# Patient Record
Sex: MALE | Race: WHITE | NOT HISPANIC OR LATINO | URBAN - METROPOLITAN AREA
[De-identification: names, ages, dates, MRNs, and addresses within clinical notes are randomized per-mention and may not be internally consistent; named-entity substitution may affect disease eponyms.]

---

## 2018-01-06 ENCOUNTER — EMERGENCY (EMERGENCY)
Facility: HOSPITAL | Age: 73
LOS: 1 days | Discharge: HOME | End: 2018-01-06
Attending: EMERGENCY MEDICINE | Admitting: EMERGENCY MEDICINE
Payer: MEDICARE

## 2018-01-06 VITALS
HEART RATE: 67 BPM | TEMPERATURE: 98 F | RESPIRATION RATE: 16 BRPM | SYSTOLIC BLOOD PRESSURE: 109 MMHG | DIASTOLIC BLOOD PRESSURE: 61 MMHG | OXYGEN SATURATION: 98 %

## 2018-01-06 VITALS
RESPIRATION RATE: 16 BRPM | SYSTOLIC BLOOD PRESSURE: 150 MMHG | WEIGHT: 199.96 LBS | HEART RATE: 84 BPM | TEMPERATURE: 98 F | OXYGEN SATURATION: 95 % | DIASTOLIC BLOOD PRESSURE: 79 MMHG

## 2018-01-06 DIAGNOSIS — I10 ESSENTIAL (PRIMARY) HYPERTENSION: ICD-10-CM

## 2018-01-06 DIAGNOSIS — Z41.9 ENCOUNTER FOR PROCEDURE FOR PURPOSES OTHER THAN REMEDYING HEALTH STATE, UNSPECIFIED: Chronic | ICD-10-CM

## 2018-01-06 DIAGNOSIS — Z79.2 LONG TERM (CURRENT) USE OF ANTIBIOTICS: ICD-10-CM

## 2018-01-06 DIAGNOSIS — R39.15 URGENCY OF URINATION: ICD-10-CM

## 2018-01-06 DIAGNOSIS — N39.0 URINARY TRACT INFECTION, SITE NOT SPECIFIED: ICD-10-CM

## 2018-01-06 DIAGNOSIS — Z79.899 OTHER LONG TERM (CURRENT) DRUG THERAPY: ICD-10-CM

## 2018-01-06 DIAGNOSIS — E78.5 HYPERLIPIDEMIA, UNSPECIFIED: ICD-10-CM

## 2018-01-06 LAB
APPEARANCE UR: (no result)
BILIRUB UR-MCNC: NEGATIVE — SIGNIFICANT CHANGE UP
COLOR SPEC: YELLOW — SIGNIFICANT CHANGE UP
DIFF PNL FLD: (no result)
GLUCOSE UR QL: NEGATIVE — SIGNIFICANT CHANGE UP
KETONES UR-MCNC: (no result) MG/DL
LEUKOCYTE ESTERASE UR-ACNC: (no result)
NITRITE UR-MCNC: POSITIVE
PH UR: 6 — SIGNIFICANT CHANGE UP (ref 5–8)
PROT UR-MCNC: 100 MG/DL
SP GR SPEC: 1.02 — SIGNIFICANT CHANGE UP (ref 1–1.03)
UROBILINOGEN FLD QL: 0.2 E.U./DL — SIGNIFICANT CHANGE UP

## 2018-01-06 PROCEDURE — 81001 URINALYSIS AUTO W/SCOPE: CPT

## 2018-01-06 PROCEDURE — 99284 EMERGENCY DEPT VISIT MOD MDM: CPT | Mod: 25

## 2018-01-06 PROCEDURE — 51702 INSERT TEMP BLADDER CATH: CPT

## 2018-01-06 PROCEDURE — 96374 THER/PROPH/DIAG INJ IV PUSH: CPT | Mod: XU

## 2018-01-06 PROCEDURE — 99284 EMERGENCY DEPT VISIT MOD MDM: CPT

## 2018-01-06 PROCEDURE — 87186 SC STD MICRODIL/AGAR DIL: CPT

## 2018-01-06 PROCEDURE — 87086 URINE CULTURE/COLONY COUNT: CPT

## 2018-01-06 RX ORDER — CEFTRIAXONE 500 MG/1
1 INJECTION, POWDER, FOR SOLUTION INTRAMUSCULAR; INTRAVENOUS ONCE
Qty: 0 | Refills: 0 | Status: COMPLETED | OUTPATIENT
Start: 2018-01-06 | End: 2018-01-06

## 2018-01-06 RX ADMIN — CEFTRIAXONE 100 GRAM(S): 500 INJECTION, POWDER, FOR SOLUTION INTRAMUSCULAR; INTRAVENOUS at 15:30

## 2018-01-06 NOTE — ED ADULT TRIAGE NOTE - CHIEF COMPLAINT QUOTE
Patient c/o urinary urgency , dribbling when urinate and pressure on lower abdomen since 1:30 am . History of Prostate cancer .

## 2018-01-06 NOTE — ED PROVIDER NOTE - MEDICAL DECISION MAKING DETAILS
pt in urinary retention, has been on bactrim followed by keflex for uti, has outpatient gu  and will f/u in 1 d, no signs of pyelo, pratt placed and drained 1100cc urine, + for inf, culture sent although will unlikely be helpful given abx use, f/u w/gu, understands to return immediately for any fevers/pain / worsening symptoms

## 2018-01-06 NOTE — ED ADULT NURSE NOTE - OBJECTIVE STATEMENT
pt presents to ED A&Ox3 c/o lower abdominal pain/pressure starting around 130 am. pt also reports some burning urination, urgency and dribbling. denies fever/chill, hematuria, n/v/d.

## 2018-01-06 NOTE — ED PROVIDER NOTE - OBJECTIVE STATEMENT
The pt is a 73 y/o M, who presents to ED c/o urgency and hesitancy and small urine output today. Pt has been tx for UTI by his gu as outpatient - 2 wk course of bactrim - no resolution of symptoms, now started on keflex a few d ago and today in retention. Denies flank pain, fevers, chills, n/v, cp, sob, cough

## 2018-01-06 NOTE — ED ADULT NURSE NOTE - CHPI ED SYMPTOMS NEG
no hematuria/no abdominal distension/no vomiting/no fever/no nausea/no chills/no blood in stool/no diarrhea

## 2018-01-06 NOTE — ED PROVIDER NOTE - ATTENDING CONTRIBUTION TO CARE
71 yo male co pain with urination and difficulty urinating today.  has been on keflex for past 2 days by his urologist.  pratt placed here, 1100ml clear urine returned, pt feels much better.  given 1 dose of ceftriazoxe, ua looks infected, urine culture sent, will continue abx and fu with his urologist

## 2018-01-06 NOTE — ED ADULT NURSE REASSESSMENT NOTE - NS ED NURSE REASSESS COMMENT FT1
14 fr indwelling pratt inserted per orders. pt tolerated well. 350 cc clear, yellow urine out upon insertion.

## 2018-01-06 NOTE — ED PROVIDER NOTE - PROGRESS NOTE DETAILS
discussed w/gu - recommending dose of iv ceftriaxone, to con't keflex and f/u w/his gu in 1 d -- pt happy w/plan

## 2018-01-06 NOTE — ED ADULT NURSE NOTE - PMH
HLD (hyperlipidemia)    HTN (hypertension)    Prostate calculus    Prostate cancer    Sleep apnea    Thyroid disease

## 2018-01-09 LAB
-  AMPICILLIN/SULBACTAM: SIGNIFICANT CHANGE UP
-  AMPICILLIN: SIGNIFICANT CHANGE UP
-  CEFAZOLIN: SIGNIFICANT CHANGE UP
-  CEFTRIAXONE: SIGNIFICANT CHANGE UP
-  CIPROFLOXACIN: SIGNIFICANT CHANGE UP
-  GENTAMICIN: SIGNIFICANT CHANGE UP
-  NITROFURANTOIN: SIGNIFICANT CHANGE UP
-  PIPERACILLIN/TAZOBACTAM: SIGNIFICANT CHANGE UP
-  TOBRAMYCIN: SIGNIFICANT CHANGE UP
-  TRIMETHOPRIM/SULFAMETHOXAZOLE: SIGNIFICANT CHANGE UP
CULTURE RESULTS: SIGNIFICANT CHANGE UP
METHOD TYPE: SIGNIFICANT CHANGE UP
ORGANISM # SPEC MICROSCOPIC CNT: SIGNIFICANT CHANGE UP
ORGANISM # SPEC MICROSCOPIC CNT: SIGNIFICANT CHANGE UP
SPECIMEN SOURCE: SIGNIFICANT CHANGE UP

## 2018-02-13 VITALS
HEART RATE: 75 BPM | RESPIRATION RATE: 17 BRPM | OXYGEN SATURATION: 94 % | HEIGHT: 72 IN | DIASTOLIC BLOOD PRESSURE: 67 MMHG | SYSTOLIC BLOOD PRESSURE: 138 MMHG | WEIGHT: 199.96 LBS | TEMPERATURE: 98 F

## 2018-02-13 NOTE — ASU PATIENT PROFILE, ADULT - PSH
Surgery, elective  tumor removal from neck  Surgery, elective  prostate cancer surgery  Surgery, elective  carotid artery surgery

## 2018-02-14 ENCOUNTER — INPATIENT (INPATIENT)
Facility: HOSPITAL | Age: 73
LOS: 0 days | Discharge: HOME | DRG: 713 | End: 2018-02-15
Attending: UROLOGY | Admitting: UROLOGY
Payer: COMMERCIAL

## 2018-02-14 DIAGNOSIS — Z41.9 ENCOUNTER FOR PROCEDURE FOR PURPOSES OTHER THAN REMEDYING HEALTH STATE, UNSPECIFIED: Chronic | ICD-10-CM

## 2018-02-14 RX ORDER — LIDOCAINE 4 G/100G
1 CREAM TOPICAL ONCE
Qty: 0 | Refills: 0 | Status: DISCONTINUED | OUTPATIENT
Start: 2018-02-14 | End: 2018-02-15

## 2018-02-14 RX ORDER — CIPROFLOXACIN LACTATE 400MG/40ML
250 VIAL (ML) INTRAVENOUS EVERY 12 HOURS
Qty: 0 | Refills: 0 | Status: DISCONTINUED | OUTPATIENT
Start: 2018-02-14 | End: 2018-02-15

## 2018-02-14 RX ORDER — MORPHINE SULFATE 50 MG/1
2 CAPSULE, EXTENDED RELEASE ORAL ONCE
Qty: 0 | Refills: 0 | Status: DISCONTINUED | OUTPATIENT
Start: 2018-02-14 | End: 2018-02-15

## 2018-02-14 RX ORDER — SODIUM CHLORIDE 9 MG/ML
1000 INJECTION, SOLUTION INTRAVENOUS
Qty: 0 | Refills: 0 | Status: DISCONTINUED | OUTPATIENT
Start: 2018-02-14 | End: 2018-02-15

## 2018-02-14 RX ORDER — SENNA PLUS 8.6 MG/1
1 TABLET ORAL AT BEDTIME
Qty: 0 | Refills: 0 | Status: DISCONTINUED | OUTPATIENT
Start: 2018-02-14 | End: 2018-02-15

## 2018-02-14 RX ORDER — ONDANSETRON 8 MG/1
4 TABLET, FILM COATED ORAL EVERY 6 HOURS
Qty: 0 | Refills: 0 | Status: DISCONTINUED | OUTPATIENT
Start: 2018-02-14 | End: 2018-02-15

## 2018-02-14 RX ORDER — ATENOLOL 25 MG/1
50 TABLET ORAL DAILY
Qty: 0 | Refills: 0 | Status: DISCONTINUED | OUTPATIENT
Start: 2018-02-14 | End: 2018-02-15

## 2018-02-14 RX ORDER — ACETAMINOPHEN 500 MG
650 TABLET ORAL EVERY 6 HOURS
Qty: 0 | Refills: 0 | Status: DISCONTINUED | OUTPATIENT
Start: 2018-02-14 | End: 2018-02-15

## 2018-02-14 RX ORDER — DOCUSATE SODIUM 100 MG
100 CAPSULE ORAL THREE TIMES A DAY
Qty: 0 | Refills: 0 | Status: DISCONTINUED | OUTPATIENT
Start: 2018-02-14 | End: 2018-02-15

## 2018-02-14 RX ORDER — SODIUM CHLORIDE 9 MG/ML
500 INJECTION INTRAMUSCULAR; INTRAVENOUS; SUBCUTANEOUS ONCE
Qty: 0 | Refills: 0 | Status: COMPLETED | OUTPATIENT
Start: 2018-02-14 | End: 2018-02-14

## 2018-02-14 RX ORDER — TAMSULOSIN HYDROCHLORIDE 0.4 MG/1
0.4 CAPSULE ORAL AT BEDTIME
Qty: 0 | Refills: 0 | Status: DISCONTINUED | OUTPATIENT
Start: 2018-02-14 | End: 2018-02-15

## 2018-02-14 RX ORDER — ATORVASTATIN CALCIUM 80 MG/1
80 TABLET, FILM COATED ORAL AT BEDTIME
Qty: 0 | Refills: 0 | Status: DISCONTINUED | OUTPATIENT
Start: 2018-02-14 | End: 2018-02-15

## 2018-02-14 RX ORDER — FINASTERIDE 5 MG/1
5 TABLET, FILM COATED ORAL DAILY
Qty: 0 | Refills: 0 | Status: DISCONTINUED | OUTPATIENT
Start: 2018-02-14 | End: 2018-02-15

## 2018-02-14 RX ADMIN — Medication 100 MILLIGRAM(S): at 22:24

## 2018-02-14 RX ADMIN — SODIUM CHLORIDE 75 MILLILITER(S): 9 INJECTION, SOLUTION INTRAVENOUS at 16:29

## 2018-02-14 RX ADMIN — ATORVASTATIN CALCIUM 80 MILLIGRAM(S): 80 TABLET, FILM COATED ORAL at 22:25

## 2018-02-14 RX ADMIN — SENNA PLUS 1 TABLET(S): 8.6 TABLET ORAL at 22:24

## 2018-02-14 RX ADMIN — SODIUM CHLORIDE 500 MILLILITER(S): 9 INJECTION INTRAMUSCULAR; INTRAVENOUS; SUBCUTANEOUS at 15:00

## 2018-02-14 RX ADMIN — Medication 250 MILLIGRAM(S): at 17:55

## 2018-02-14 RX ADMIN — TAMSULOSIN HYDROCHLORIDE 0.4 MILLIGRAM(S): 0.4 CAPSULE ORAL at 22:24

## 2018-02-14 NOTE — PROGRESS NOTE ADULT - SUBJECTIVE AND OBJECTIVE BOX
Postop:  Pt denies chest pain, SOB, NV or severe abdominal pain             Vital Signs Last 24 Hrs  T(C): 35.8 (14 Feb 2018 10:20), Max: 35.8 (14 Feb 2018 10:20)  T(F): 96.4 (14 Feb 2018 10:20), Max: 96.4 (14 Feb 2018 10:20)  HR: 76 (14 Feb 2018 12:30) (68 - 90)  BP: 138/62 (14 Feb 2018 12:30) (117/63 - 138/62)  BP(mean): 86 (14 Feb 2018 10:35) (86 - 96)  RR: 16 (14 Feb 2018 12:30) (14 - 16)  SpO2: 97% (14 Feb 2018 12:30) (91% - 97%)    I&O's Summary    14 Feb 2018 07:01  -  14 Feb 2018 13:04  --------------------------------------------------------  IN: 1225 mL / OUT: 105 mL / NET: 1120 mL        Gen: nad    Abd: ntnd    : pratt draining clear             cultures    A/P:72M hx necrotic prostate s/p cysto, TURP 2/14  1- Pratt - monitor UOP  2- Regular diet  3- ABX- Cipro  4- Pain meds PRN  5- OOB amb, IS

## 2018-02-14 NOTE — BRIEF OPERATIVE NOTE - PROCEDURE
<<-----Click on this checkbox to enter Procedure TURP (transurethral resection of prostate)  02/14/2018    Active  SMENDONCA  Cystoscopic laser lithotripsy of bladder calculus  02/14/2018  from necrotic prostate  Active  SMENDONCA

## 2018-02-15 ENCOUNTER — TRANSCRIPTION ENCOUNTER (OUTPATIENT)
Age: 73
End: 2018-02-15

## 2018-02-15 VITALS — OXYGEN SATURATION: 97 % | HEART RATE: 85 BPM

## 2018-02-15 DIAGNOSIS — N40.0 BENIGN PROSTATIC HYPERPLASIA WITHOUT LOWER URINARY TRACT SYMPTOMS: ICD-10-CM

## 2018-02-15 LAB
ANION GAP SERPL CALC-SCNC: 12 MMOL/L — SIGNIFICANT CHANGE UP (ref 5–17)
BASOPHILS NFR BLD AUTO: 0.1 % — SIGNIFICANT CHANGE UP (ref 0–2)
BUN SERPL-MCNC: 30 MG/DL — HIGH (ref 7–23)
CALCIUM SERPL-MCNC: 9.5 MG/DL — SIGNIFICANT CHANGE UP (ref 8.4–10.5)
CHLORIDE SERPL-SCNC: 98 MMOL/L — SIGNIFICANT CHANGE UP (ref 96–108)
CO2 SERPL-SCNC: 25 MMOL/L — SIGNIFICANT CHANGE UP (ref 22–31)
CREAT SERPL-MCNC: 1.19 MG/DL — SIGNIFICANT CHANGE UP (ref 0.5–1.3)
EOSINOPHIL NFR BLD AUTO: 0 % — SIGNIFICANT CHANGE UP (ref 0–6)
GLUCOSE SERPL-MCNC: 146 MG/DL — HIGH (ref 70–99)
HCT VFR BLD CALC: 37.3 % — LOW (ref 39–50)
HGB BLD-MCNC: 12.5 G/DL — LOW (ref 13–17)
LYMPHOCYTES # BLD AUTO: 8.4 % — LOW (ref 13–44)
MAGNESIUM SERPL-MCNC: 1.5 MG/DL — LOW (ref 1.6–2.6)
MCHC RBC-ENTMCNC: 32.9 PG — SIGNIFICANT CHANGE UP (ref 27–34)
MCHC RBC-ENTMCNC: 33.5 G/DL — SIGNIFICANT CHANGE UP (ref 32–36)
MCV RBC AUTO: 98.2 FL — SIGNIFICANT CHANGE UP (ref 80–100)
MONOCYTES NFR BLD AUTO: 7.4 % — SIGNIFICANT CHANGE UP (ref 2–14)
NEUTROPHILS NFR BLD AUTO: 84.1 % — HIGH (ref 43–77)
PHOSPHATE SERPL-MCNC: 3.7 MG/DL — SIGNIFICANT CHANGE UP (ref 2.5–4.5)
PLATELET # BLD AUTO: 256 K/UL — SIGNIFICANT CHANGE UP (ref 150–400)
POTASSIUM SERPL-MCNC: 4.2 MMOL/L — SIGNIFICANT CHANGE UP (ref 3.5–5.3)
POTASSIUM SERPL-SCNC: 4.2 MMOL/L — SIGNIFICANT CHANGE UP (ref 3.5–5.3)
RBC # BLD: 3.8 M/UL — LOW (ref 4.2–5.8)
RBC # FLD: 13 % — SIGNIFICANT CHANGE UP (ref 10.3–16.9)
SODIUM SERPL-SCNC: 135 MMOL/L — SIGNIFICANT CHANGE UP (ref 135–145)
WBC # BLD: 12.3 K/UL — HIGH (ref 3.8–10.5)
WBC # FLD AUTO: 12.3 K/UL — HIGH (ref 3.8–10.5)

## 2018-02-15 PROCEDURE — C1889: CPT

## 2018-02-15 PROCEDURE — 85025 COMPLETE CBC W/AUTO DIFF WBC: CPT

## 2018-02-15 PROCEDURE — 82365 CALCULUS SPECTROSCOPY: CPT

## 2018-02-15 PROCEDURE — 52601 PROSTATECTOMY (TURP): CPT

## 2018-02-15 PROCEDURE — 84100 ASSAY OF PHOSPHORUS: CPT

## 2018-02-15 PROCEDURE — 86901 BLOOD TYPING SEROLOGIC RH(D): CPT

## 2018-02-15 PROCEDURE — 87086 URINE CULTURE/COLONY COUNT: CPT

## 2018-02-15 PROCEDURE — 86850 RBC ANTIBODY SCREEN: CPT

## 2018-02-15 PROCEDURE — 94660 CPAP INITIATION&MGMT: CPT

## 2018-02-15 PROCEDURE — 86900 BLOOD TYPING SEROLOGIC ABO: CPT

## 2018-02-15 PROCEDURE — 36415 COLL VENOUS BLD VENIPUNCTURE: CPT

## 2018-02-15 PROCEDURE — 52325 CYSTOSCOPY STONE REMOVAL: CPT

## 2018-02-15 PROCEDURE — 87106 FUNGI IDENTIFICATION YEAST: CPT

## 2018-02-15 PROCEDURE — 83735 ASSAY OF MAGNESIUM: CPT

## 2018-02-15 PROCEDURE — 80048 BASIC METABOLIC PNL TOTAL CA: CPT

## 2018-02-15 RX ORDER — FINASTERIDE 5 MG/1
1 TABLET, FILM COATED ORAL
Qty: 0 | Refills: 0 | COMMUNITY
Start: 2018-02-15

## 2018-02-15 RX ORDER — CIPROFLOXACIN LACTATE 400MG/40ML
1 VIAL (ML) INTRAVENOUS
Qty: 6 | Refills: 0 | OUTPATIENT
Start: 2018-02-15 | End: 2018-02-17

## 2018-02-15 RX ORDER — MAGNESIUM SULFATE 500 MG/ML
1 VIAL (ML) INJECTION ONCE
Qty: 0 | Refills: 0 | Status: COMPLETED | OUTPATIENT
Start: 2018-02-15 | End: 2018-02-15

## 2018-02-15 RX ORDER — DIPHENHYDRAMINE HCL 50 MG
25 CAPSULE ORAL ONCE
Qty: 0 | Refills: 0 | Status: COMPLETED | OUTPATIENT
Start: 2018-02-15 | End: 2018-02-15

## 2018-02-15 RX ORDER — PHENAZOPYRIDINE HCL 100 MG
2 TABLET ORAL
Qty: 12 | Refills: 0 | OUTPATIENT
Start: 2018-02-15 | End: 2018-02-16

## 2018-02-15 RX ADMIN — Medication 25 MILLIGRAM(S): at 00:30

## 2018-02-15 RX ADMIN — ATENOLOL 50 MILLIGRAM(S): 25 TABLET ORAL at 06:08

## 2018-02-15 RX ADMIN — Medication 100 GRAM(S): at 11:11

## 2018-02-15 RX ADMIN — FINASTERIDE 5 MILLIGRAM(S): 5 TABLET, FILM COATED ORAL at 11:12

## 2018-02-15 RX ADMIN — Medication 100 MILLIGRAM(S): at 06:08

## 2018-02-15 RX ADMIN — Medication 250 MILLIGRAM(S): at 06:08

## 2018-02-15 RX ADMIN — Medication 10 MILLIGRAM(S): at 06:08

## 2018-02-15 NOTE — DISCHARGE NOTE ADULT - PLAN OF CARE
pain and infection control, ambulation Stay well hydrated and active. Medications have been sent to your pharmacy, please complete the antibiotic in full as indicated. If you have uncontrollable pain or fever, difficulty voiding or any acute concerns please go to ER or call MD.

## 2018-02-15 NOTE — DISCHARGE NOTE ADULT - MEDICATION SUMMARY - MEDICATIONS TO TAKE
I will START or STAY ON the medications listed below when I get home from the hospital:    finasteride 5 mg oral tablet  -- 1 tab(s) by mouth once a day  -- Indication: For home med    enalapril  -- 15 milligram(s) by mouth once a day  -- Indication: For home med    tamsulosin 0.4 mg oral capsule  -- 1 cap(s) by mouth once a day  -- Indication: For home med    Lipitor 80 mg oral tablet  -- 1 tab(s) by mouth once a day (at bedtime)  -- Indication: For home med     doxycycline 20 mg oral tablet  --  by mouth 2 times a day  -- Indication: For home med    atenolol 25 mg oral tablet  -- 1 tab(s) by mouth once a day  -- Indication: For home med    hydrochlorothiazide 25 mg oral tablet  -- 1 tab(s) by mouth once a day  -- Indication: For home med    Pyridium 100 mg oral tablet  -- 2 tab(s) by mouth 3 times a day (after meals), As Needed dysuria  -- May discolor urine or feces.  Medication should be taken with plenty of water.  Take with food or milk.    -- Indication: For as needed for painful urination    ciprofloxacin 250 mg oral tablet  -- 1 tab(s) by mouth every 12 hours  -- Indication: For antibiotic

## 2018-02-15 NOTE — DISCHARGE NOTE ADULT - HOSPITAL COURSE
72M with BPH underwent cystoscopy, laser of necrotic prostate and TURP without complication. Pt tolerated procedure well, is hemodynamically stable with AVSS, tolerating PO, ambulating and voiding well.

## 2018-02-15 NOTE — DISCHARGE NOTE ADULT - PATIENT PORTAL LINK FT
You can access the [facility] Patient Portal, offered by [facility], by registering with the following website: [email or web address]

## 2018-02-15 NOTE — DISCHARGE NOTE ADULT - CARE PROVIDER_API CALL
Kwabena Lieberman), Urology  [address] [place] [address]  Phone: [phone]  Fax: [phone]

## 2018-02-15 NOTE — PROGRESS NOTE ADULT - SUBJECTIVE AND OBJECTIVE BOX
INTERVAL HPI/OVERNIGHT EVENTS:  No acute events overnight.    VITALS:    T(F): 98.1 (02-15-18 @ 00:21), Max: 98.1 (02-15-18 @ 00:21)  HR: 79 (02-15-18 @ 00:21) (62 - 90)  BP: 151/77 (02-15-18 @ 00:21) (117/63 - 168/81)  RR: 16 (02-15-18 @ 00:21) (12 - 20)  SpO2: 97% (02-15-18 @ 00:21) (91% - 97%)  Wt(kg): --    I&O's Detail    14 Feb 2018 07:01  -  15 Feb 2018 05:58  --------------------------------------------------------  IN:    lactated ringers.: 375 mL    Other: 1450 mL    Sodium Chloride 0.9% IV Bolus: 500 mL  Total IN: 2325 mL    OUT:    Indwelling Catheter - Urethral: 440 mL  Total OUT: 440 mL    Total NET: 1885 mL          MEDICATIONS:    ANTIBIOTICS:  ciprofloxacin     Tablet 250 milliGRAM(s) Oral every 12 hours      PAIN CONTROL:  acetaminophen   Tablet. 650 milliGRAM(s) Oral every 6 hours PRN  morphine  - Injectable 2 milliGRAM(s) IV Push once PRN  ondansetron Injectable 4 milliGRAM(s) IV Push every 6 hours PRN       MEDS:      HEME/ONC        PHYSICAL EXAM:  General: No acute distress.  Alert and Oriented  Abdominal Exam: soft, NT, ND   Exam: FC intact, urine pinkish      LABS:                RADIOLOGY & ADDITIONAL TESTS:

## 2018-02-15 NOTE — DISCHARGE NOTE ADULT - CARE PLAN
Principal Discharge DX:	Benign prostatic hyperplasia with lower urinary tract symptoms, symptom details unspecified  Goal:	pain and infection control, ambulation  Assessment and plan of treatment:	Stay well hydrated and active. Medications have been sent to your pharmacy, please complete the antibiotic in full as indicated. If you have uncontrollable pain or fever, difficulty voiding or any acute concerns please go to ER or call MD.

## 2018-02-16 LAB
CULTURE RESULTS: SIGNIFICANT CHANGE UP
SPECIMEN SOURCE: SIGNIFICANT CHANGE UP

## 2018-02-17 LAB
CULTURE RESULTS: SIGNIFICANT CHANGE UP
SPECIMEN SOURCE: SIGNIFICANT CHANGE UP

## 2018-02-20 DIAGNOSIS — N40.1 BENIGN PROSTATIC HYPERPLASIA WITH LOWER URINARY TRACT SYMPTOMS: ICD-10-CM

## 2018-02-20 DIAGNOSIS — N42.0 CALCULUS OF PROSTATE: ICD-10-CM

## 2018-02-20 DIAGNOSIS — N13.8 OTHER OBSTRUCTIVE AND REFLUX UROPATHY: ICD-10-CM

## 2018-02-20 DIAGNOSIS — R33.8 OTHER RETENTION OF URINE: ICD-10-CM

## 2018-02-20 DIAGNOSIS — Z85.46 PERSONAL HISTORY OF MALIGNANT NEOPLASM OF PROSTATE: ICD-10-CM

## 2018-02-20 DIAGNOSIS — I10 ESSENTIAL (PRIMARY) HYPERTENSION: ICD-10-CM

## 2018-02-20 DIAGNOSIS — N42.89 OTHER SPECIFIED DISORDERS OF PROSTATE: ICD-10-CM

## 2018-02-20 LAB — COMPN STONE: SIGNIFICANT CHANGE UP

## 2019-11-18 ENCOUNTER — APPOINTMENT (OUTPATIENT)
Dept: GASTROENTEROLOGY | Facility: CLINIC | Age: 74
End: 2019-11-18

## 2020-08-10 ENCOUNTER — TRANSCRIPTION ENCOUNTER (OUTPATIENT)
Age: 75
End: 2020-08-10

## 2020-08-10 ENCOUNTER — EMERGENCY (EMERGENCY)
Facility: HOSPITAL | Age: 75
LOS: 1 days | Discharge: HOME | End: 2020-08-10
Attending: EMERGENCY MEDICINE | Admitting: EMERGENCY MEDICINE
Payer: MEDICARE

## 2020-08-10 VITALS
TEMPERATURE: 97 F | SYSTOLIC BLOOD PRESSURE: 102 MMHG | DIASTOLIC BLOOD PRESSURE: 65 MMHG | HEART RATE: 77 BPM | RESPIRATION RATE: 18 BRPM | WEIGHT: 195.11 LBS | OXYGEN SATURATION: 95 % | HEIGHT: 72 IN

## 2020-08-10 VITALS
OXYGEN SATURATION: 97 % | SYSTOLIC BLOOD PRESSURE: 106 MMHG | DIASTOLIC BLOOD PRESSURE: 69 MMHG | RESPIRATION RATE: 16 BRPM | HEART RATE: 68 BPM

## 2020-08-10 DIAGNOSIS — Z41.9 ENCOUNTER FOR PROCEDURE FOR PURPOSES OTHER THAN REMEDYING HEALTH STATE, UNSPECIFIED: Chronic | ICD-10-CM

## 2020-08-10 LAB
APPEARANCE UR: CLEAR — SIGNIFICANT CHANGE UP
BILIRUB UR-MCNC: NEGATIVE — SIGNIFICANT CHANGE UP
COLOR SPEC: YELLOW — SIGNIFICANT CHANGE UP
DIFF PNL FLD: ABNORMAL
GLUCOSE UR QL: NEGATIVE — SIGNIFICANT CHANGE UP
KETONES UR-MCNC: NEGATIVE — SIGNIFICANT CHANGE UP
LEUKOCYTE ESTERASE UR-ACNC: ABNORMAL
NITRITE UR-MCNC: NEGATIVE — SIGNIFICANT CHANGE UP
PH UR: 6 — SIGNIFICANT CHANGE UP (ref 5–8)
PROT UR-MCNC: 100 MG/DL
SP GR SPEC: 1.02 — SIGNIFICANT CHANGE UP (ref 1–1.03)
UROBILINOGEN FLD QL: 0.2 E.U./DL — SIGNIFICANT CHANGE UP

## 2020-08-10 PROCEDURE — 99283 EMERGENCY DEPT VISIT LOW MDM: CPT | Mod: 25

## 2020-08-10 PROCEDURE — 51702 INSERT TEMP BLADDER CATH: CPT

## 2020-08-10 PROCEDURE — 87086 URINE CULTURE/COLONY COUNT: CPT

## 2020-08-10 PROCEDURE — 81001 URINALYSIS AUTO W/SCOPE: CPT

## 2020-08-10 PROCEDURE — 99283 EMERGENCY DEPT VISIT LOW MDM: CPT

## 2020-08-10 RX ORDER — IPRATROPIUM/ALBUTEROL SULFATE 18-103MCG
3 AEROSOL WITH ADAPTER (GRAM) INHALATION ONCE
Refills: 0 | Status: DISCONTINUED | OUTPATIENT
Start: 2020-08-10 | End: 2020-08-10

## 2020-08-10 RX ORDER — FUROSEMIDE 40 MG
80 TABLET ORAL DAILY
Refills: 0 | Status: DISCONTINUED | OUTPATIENT
Start: 2020-08-10 | End: 2020-08-10

## 2020-08-10 NOTE — ED PROVIDER NOTE - CARE PROVIDER_API CALL
Yosef Tapia  UROLOGY  [address] [address]  Phone: [phone]  Fax: [phone]  Follow Up Time:

## 2020-08-10 NOTE — ED PROVIDER NOTE - CLINICAL SUMMARY MEDICAL DECISION MAKING FREE TEXT BOX
This is a pleasant 74 year old male omhx htn hlkd prostates ca presenting to the ed with urinary retention. states had pratt placed 1 week ago at [facility] and given abx. appt to follow up with urology on Friday, but pratt fell out during the night and patietn has been unable to urinate since. no pain fevers or chills. on exam pt appears well, non-toxic. abd soft. plan for pratt placement This is a pleasant 74 year old male omhx htn hlkd prostates ca presenting to the ed with urinary retention. states had pratt placed 1 week ago at [facility] and given abx. appt to follow up with urology on Friday, but pratt fell out during the night and patietn has been unable to urinate since. no pain fevers or chills. on exam pt appears well, non-toxic. abd soft. plan for pratt placement, ua appears imrpoved from [facility] lab results pt presented to the ed with. urine culture pending. pt states he is currently on 10 day course of ABX (ends Friday). pt dc home to follow up with urology and take abx. ED evaluation and management discussed with the patient and family (if available) in detail.  Close PMD follow up encouraged.  Strict ED return instructions discussed in detail and patient given the opportunity to ask any questions about their discharge diagnosis and instructions. Patient verbalized understanding. Patient is agreeable to plan.

## 2020-08-10 NOTE — ED ADULT NURSE NOTE - PSH
Surgery, elective  carotid artery surgery  Surgery, elective  prostate cancer surgery  Surgery, elective  tumor removal from neck

## 2020-08-10 NOTE — ED PROVIDER NOTE - PHYSICAL EXAMINATION
General: Patient is well developed and well nourised. Patient is alert and oriented to person, place and date. Patient is laying comfortably in stretcher and appears in no acute distress.  Lungs: Equal chest expansion. No note of retractions.  Abdomen: Bowel sounds present in all four quadrants. Soft, non-tender, non-distended without signs of masses, rebound or guarding. No note of hepatosplenomegaly. No CVA tenderness bilaterally. Negative Mcmahon sign. No pain present over McBurney's point.  Musculoskeletal: No edema, erythema, ecchymosis, atrophy or deformity. Full range of motion in all four extremities.  No clubbing or cyanosis. No point tenderness to palpation.   Neuro: Cranial nerves intact. GCS 15. Moving all extremities without discomfort. Sensation intact. Gait steady  Skin: Warm, dry and intact without evidence of rashes, bruising, pallor, jaundice or cyanosis.   Psych: Mood and affect appropriate.

## 2020-08-10 NOTE — ED PROVIDER NOTE - OBJECTIVE STATEMENT
states that 1 week ago he had a catheter placed.  states that he was unable to urinate, was seen at [facility] and a catheter was placed. states that he was placedon abx (cefpodoxime). pt had follow up appointment scheduled for this Friday. states that in the middle of the night, the catheter fell out. no pain. states depends was wet with urine, no blood. states that he is unable to now urinate. does not endorse fevers chills chest pain palpitations cough sob nausea vomiting diarrhea or abd pain.

## 2020-08-10 NOTE — ED PROVIDER NOTE - ATTENDING CONTRIBUTION TO CARE
74M PMH HTN, HLD, ?prostate ca, p/w unable to urinate. Had pratt placed ~1w ago at [facility], was started on cefpodoxime for UTI. Last night catheter fell out (visualized entire catheter) and has been unable to urinate since then, increasing suprapubic pain. Denies f/c, SOB/CP, NVD, lightheaded, focal weakness/numbness. Vitals wnl, exam as above. Very well appearing.  In ED: pratt placed w/ ~4-500 cc, pt w/ immediate relief. Abd soft NTND. completely asymptomatic. UA weakly positive, already on abx. Will hold any additional abx pending urine cx. Has  f/u. Clinically no indication for further emergent ED workup or hospitalization at this time. Comfortable for dc, outpt f/u.   ddx: Urinary retention

## 2020-08-10 NOTE — ED ADULT NURSE NOTE - OBJECTIVE STATEMENT
Pt presents to Ed AAOx3, pleasant and calm. Presented to OSH ED on 8/3 for acute urinary retention (hx of BPH and prostate CA), lower abd. pressure and no UO for >12 hours. Pratt cath inserted and scheduled to be removed this Friday. Pt accidentally dislodged pratt overnight and is now having lower abd. pressure with no UO.  Started on abx 8/3, pt also takes flomax daily.

## 2020-08-10 NOTE — ED PROVIDER NOTE - PATIENT PORTAL LINK FT
You can access the FollowMyHealth Patient Portal offered by [facility] by registering at the following website: [email or web address]. By joining [employer]’s FollowMyHealth portal, you will also be able to view your health information using other applications (apps) compatible with our system.

## 2020-08-10 NOTE — ED ADULT TRIAGE NOTE - CHIEF COMPLAINT QUOTE
Patient complaining of urinary retention.  Patient states he was recently seen at an OSH, pratt placed, which "fell out last night."  Patient denies any CP, SOB, dizziness, or any other complaints at this time.

## 2020-08-10 NOTE — ED PROVIDER NOTE - NSFOLLOWUPINSTRUCTIONS_ED_ALL_ED_FT
URINARY RETENTION IN MEN - AfterCare(R) Instructions(ER/ED)     Urinary Retention in Men    WHAT YOU NEED TO KNOW:    Urinary retention is a condition that develops when your bladder does not empty completely when you urinate. Male Reproductive System         DISCHARGE INSTRUCTIONS:    Medicines:     Medicines can help decrease the size of your prostate, fight infection, and help you urinate more easily.      Take your medicine as directed. Contact your healthcare provider if you think your medicine is not helping or if you have side effects. Tell him or her if you are allergic to any medicine. Keep a list of the medicines, vitamins, and herbs you take. Include the amounts, and when and why you take them. Bring the list or the pill bottles to follow-up visits. Carry your medicine list with you in case of an emergency.    Francisco catheter care: You may need a Francisco catheter for up to 2 weeks at home. Healthcare providers will give you a smaller leg bag to collect urine. Keep the bag below your waist. This will prevent urine from flowing back into your bladder and causing an infection or other problems. Also, keep the tube free of kinks so the urine will drain properly. Do not pull on the catheter. This can cause pain and bleeding, and may cause the catheter to come out. Ask your healthcare provider or urologist for more information on Francisco catheter care.    Urinate regularly: When your catheter is removed, do not let your bladder become too full before you urinate. Set regular times each day to urinate. Urinate as soon as you feel the need or at least every 3 hours while you are awake. Do not drink liquids before you go to bed. Urinate right before you go to bed.    Follow up with your healthcare provider or urologist as directed: Write down your questions so you remember to ask them during your visits.     Contact your healthcare provider or urologist if:     You have a fever.      You have pain when you urinate.      You have blood in your urine.      You have problems with your catheter.      You have questions or concerns about your condition or care.    Return to the emergency department if:     You have severe abdominal pain.      You are breathing faster than usual.      Your heartbeat is faster than usual.      Your face, hands, feet, or ankles are swollen.          © Copyright [employer] 2020       back to top                      © Copyright [employer] 2020 please call urology office for follow up appointment on Friday :)    please continue to take antibiotics as prescribed    please come back to the emergency room for any worsening of symptoms- fevers chills inability to urinate, pain    URINARY RETENTION IN MEN - AfterCare(R) Instructions(ER/ED)     Urinary Retention in Men    WHAT YOU NEED TO KNOW:    Urinary retention is a condition that develops when your bladder does not empty completely when you urinate. Male Reproductive System         DISCHARGE INSTRUCTIONS:    Medicines:     Medicines can help decrease the size of your prostate, fight infection, and help you urinate more easily.      Take your medicine as directed. Contact your healthcare provider if you think your medicine is not helping or if you have side effects. Tell him or her if you are allergic to any medicine. Keep a list of the medicines, vitamins, and herbs you take. Include the amounts, and when and why you take them. Bring the list or the pill bottles to follow-up visits. Carry your medicine list with you in case of an emergency.    Francisco catheter care: You may need a Francisco catheter for up to 2 weeks at home. Healthcare providers will give you a smaller leg bag to collect urine. Keep the bag below your waist. This will prevent urine from flowing back into your bladder and causing an infection or other problems. Also, keep the tube free of kinks so the urine will drain properly. Do not pull on the catheter. This can cause pain and bleeding, and may cause the catheter to come out. Ask your healthcare provider or urologist for more information on Francisco catheter care.    Urinate regularly: When your catheter is removed, do not let your bladder become too full before you urinate. Set regular times each day to urinate. Urinate as soon as you feel the need or at least every 3 hours while you are awake. Do not drink liquids before you go to bed. Urinate right before you go to bed.    Follow up with your healthcare provider or urologist as directed: Write down your questions so you remember to ask them during your visits.     Contact your healthcare provider or urologist if:     You have a fever.      You have pain when you urinate.      You have blood in your urine.      You have problems with your catheter.      You have questions or concerns about your condition or care.    Return to the emergency department if:     You have severe abdominal pain.      You are breathing faster than usual.      Your heartbeat is faster than usual.      Your face, hands, feet, or ankles are swollen.          © Copyright [employer] 2020       back to top                      © Copyright [employer] 2020

## 2020-08-11 LAB
CULTURE RESULTS: NO GROWTH — SIGNIFICANT CHANGE UP
SPECIMEN SOURCE: SIGNIFICANT CHANGE UP

## 2020-08-14 DIAGNOSIS — R33.9 RETENTION OF URINE, UNSPECIFIED: ICD-10-CM

## 2020-08-14 DIAGNOSIS — I10 ESSENTIAL (PRIMARY) HYPERTENSION: ICD-10-CM

## 2020-08-14 DIAGNOSIS — E78.5 HYPERLIPIDEMIA, UNSPECIFIED: ICD-10-CM

## 2020-08-14 DIAGNOSIS — R39.198 OTHER DIFFICULTIES WITH MICTURITION: ICD-10-CM

## 2020-08-14 DIAGNOSIS — Z79.899 OTHER LONG TERM (CURRENT) DRUG THERAPY: ICD-10-CM

## 2020-08-25 ENCOUNTER — EMERGENCY (EMERGENCY)
Facility: HOSPITAL | Age: 75
LOS: 1 days | Discharge: HOME | End: 2020-08-25
Attending: EMERGENCY MEDICINE | Admitting: EMERGENCY MEDICINE
Payer: MEDICARE

## 2020-08-25 VITALS
OXYGEN SATURATION: 95 % | RESPIRATION RATE: 18 BRPM | HEART RATE: 76 BPM | TEMPERATURE: 98 F | SYSTOLIC BLOOD PRESSURE: 110 MMHG | DIASTOLIC BLOOD PRESSURE: 68 MMHG | HEIGHT: 72 IN | WEIGHT: 195.11 LBS

## 2020-08-25 VITALS
HEART RATE: 58 BPM | DIASTOLIC BLOOD PRESSURE: 56 MMHG | OXYGEN SATURATION: 94 % | TEMPERATURE: 98 F | SYSTOLIC BLOOD PRESSURE: 100 MMHG | RESPIRATION RATE: 16 BRPM

## 2020-08-25 DIAGNOSIS — Z41.9 ENCOUNTER FOR PROCEDURE FOR PURPOSES OTHER THAN REMEDYING HEALTH STATE, UNSPECIFIED: Chronic | ICD-10-CM

## 2020-08-25 LAB
APPEARANCE UR: ABNORMAL
BACTERIA # UR AUTO: PRESENT /HPF
BILIRUB UR-MCNC: NEGATIVE — SIGNIFICANT CHANGE UP
COLOR SPEC: YELLOW — SIGNIFICANT CHANGE UP
COMMENT - URINE: SIGNIFICANT CHANGE UP
DIFF PNL FLD: ABNORMAL
EPI CELLS # UR: SIGNIFICANT CHANGE UP /HPF (ref 0–5)
GLUCOSE UR QL: NEGATIVE — SIGNIFICANT CHANGE UP
KETONES UR-MCNC: NEGATIVE — SIGNIFICANT CHANGE UP
LEUKOCYTE ESTERASE UR-ACNC: ABNORMAL
NITRITE UR-MCNC: POSITIVE
PH UR: 6.5 — SIGNIFICANT CHANGE UP (ref 5–8)
PROT UR-MCNC: >=300 MG/DL
RBC CASTS # UR COMP ASSIST: > 10 /HPF
SP GR SPEC: >=1.03 — SIGNIFICANT CHANGE UP (ref 1–1.03)
UROBILINOGEN FLD QL: 0.2 E.U./DL — SIGNIFICANT CHANGE UP
WBC UR QL: ABNORMAL /HPF

## 2020-08-25 PROCEDURE — 99284 EMERGENCY DEPT VISIT MOD MDM: CPT

## 2020-08-25 RX ORDER — CEFPODOXIME PROXETIL 100 MG
200 TABLET ORAL ONCE
Refills: 0 | Status: DISCONTINUED | OUTPATIENT
Start: 2020-08-25 | End: 2020-08-25

## 2020-08-25 NOTE — ED ADULT NURSE REASSESSMENT NOTE - NS ED NURSE REASSESS COMMENT FT1
Patient a/oX 3, no bladder /groin pain, pratt catheter draining cloudy urine, no hematuria. Vital signs stable. Disposition pending.

## 2020-08-25 NOTE — ED ADULT NURSE REASSESSMENT NOTE - NS ED NURSE REASSESS COMMENT FT1
Patient a/oX 3, no groin or bladder pain, vital signs stable.  Bladder scan done by DR. Santizo, l3ss than 100ml noted.  Francisco catheter Korean #16 inserted aseptically, tolerated well. UA BRI snet to lab. Will continlue to monitor.

## 2020-08-25 NOTE — ED PROVIDER NOTE - PATIENT PORTAL LINK FT
You can access the FollowMyHealth Patient Portal offered by [facility] by registering at the following website: [email or web address]. By joining [employer]’s FollowMyHealth portal, you will also be able to view your health information using other applications (apps) compatible with our system.

## 2020-08-25 NOTE — ED PROVIDER NOTE - CLINICAL SUMMARY MEDICAL DECISION MAKING FREE TEXT BOX
75 y/o M with PMHx of BPH, urinary retention, HTN, and HLD presents to the ED due to cath falling out last night. Will do bedside US. Likely 50-100cc of urine inside. Pt uncomfortable without cath and is requesting for a replacement until he f/u with Dr. Tapia on Sept 7th. 75 y/o M with PMHx of BPH, urinary retention, HTN, and HLD presents to the ED due to cath falling out last night. Will do bedside US. Likely 50-100cc of urine inside. Pt uncomfortable without cath and is requesting for a replacement until he f/u with Dr. Tapia on Sept 7th.  U/A - with wbc, nitrates- pt already on cefpodoxime for 21 days - will wait for culture to prescribe another antibiotic as pt is asymptomatic and most likely colonized sample as pt has had catheter for several weeks.

## 2020-08-25 NOTE — ED ADULT TRIAGE NOTE - OTHER COMPLAINTS
c/o of pratt catheter coming out this morning, pt reports some mild blood at tip penis, pt has not voided since pratt has been out.

## 2020-08-25 NOTE — ED ADULT NURSE REASSESSMENT NOTE - NS ED NURSE REASSESS COMMENT FT1
Francisco catheter draining yellow urine, good output.  No pain or any other symptom complaint, vital signs stable.  Francisco catheter attached to leg bag.  Dicharged to h ome in stble condition.

## 2020-08-25 NOTE — ED PROVIDER NOTE - OBJECTIVE STATEMENT
73 y/o M with PMHx of BPH Urinary retention, HTN, HLD,  TURP done on Feb 2019 with constant urinary retention, 2 cath in the past month, 1 was done in [facility] but fell out and other was done in [facility] on Aug 10th. Cath came out last night and was unable to urinate. Pt limited fluid intake and tried to use to bathroom with little dribbles coming out. Does not feel distended. Pt F/u with his urologist Dr Lieberman but his urologist is on vacation so pt made an appointment with Dr. Tapia who was also on vacation. Pt will f/u with Dr. Tapia after he returns on Sept 7th.

## 2020-08-25 NOTE — ED ADULT NURSE NOTE - CHPI ED NUR SYMPTOMS NEG
no diarrhea/no fever/no hematuria/no blood in stool/no abdominal distension/no chills/no dysuria/no nausea

## 2020-08-25 NOTE — ED PROVIDER NOTE - NSFOLLOWUPINSTRUCTIONS_ED_ALL_ED_FT
Please follow up with urine culture results by calling number on discharge paper.    Follow up with urology appointment as scheduled on 9/7.    Return to ED with worsening symptoms or other concerns.    Acute Urinary Retention, Male     Acute urinary retention is a condition in which a person is unable to pass urine. This can last for a short time or for a long time. If left untreated, it can result in kidney damage or other serious complications.  What are the causes?  This condition may be caused by:  Obstruction or narrowing of the tube that drains the bladder (urethra). This may be caused by surgery or problems with nearby organs, such as the prostate gland, which can press or squeeze the urethra.Problems with the nerves in the bladder. These can be caused by diseases, such as multiple sclerosis, or by spinal cord injuries.Certain medicines.Tumors in the area of the pelvis, bladder, or urethra.Diabetes.Degenerative cognitive conditions such as delirium or dementia.Bladder or urinary tract infection.Constipation.Blood in the urine (hematuria).Injury to the bladder or urethra. Psychological (psychogenic) conditions. Someone may hold his urine due to trauma or because he does not want to use the bathroom.What increases the risk?  This condition is more likely to develop in older men. As men age, their prostate may become larger and may start pressing or squeezing on the bladder or the urethra.  What are the signs or symptoms?  Symptoms of this condition include:  Trouble urinating.Pain in the lower abdomen.Symptoms usually come on slowly over a long period of time.  How is this diagnosed?  This condition is diagnosed based on a physical exam and a medical history. You may also have other tests, including:  An ultrasound of the bladder or kidneys or both.Blood tests.A urine analysis.Additional tests may be needed such as an MRI, kidney, or bladder function tests.How is this treated?  Treatment for this condition may include:  Medicines.Placing a thin, sterile tube (catheter) into the bladder to drain urine out of the body. This is called an indwelling urinary catheter. After being inserted, the catheter is held in place with a small balloon that is filled with sterile water. Urine drains from the catheter into a collection bag outside of the body.Behavioral therapy.Treatment for any underlying conditions.If needed, you may be treated in the hospital for kidney function problems or to manage other complications.Follow these instructions at home:  Take over-the-counter and prescription medicines only as told by your health care provider. Avoid certain medicines, such as decongestants, antihistamines, and some prescription medicines. Do not take any medicine unless your health care provider has approved.If you were given an indwelling urinary catheter, take care of it as told by your health care provider.Drink enough fluid to keep your urine clear or pale yellow.If you were prescribed an antibiotic, take it as told by your health care provider. Do not stop taking the antibiotic even if you start to feel better.Do not use any products that contain nicotine or tobacco, such as cigarettes and e-cigarettes. If you need help quitting, ask your health care provider.Monitor any changes in your symptoms. Tell your health care provider about any changes.If instructed, monitor your blood pressure at home. Report changes as told by your health care provider.Keep all follow-up visits as told by your health care provider. This is important.Contact a health care provider if:  You have uncomfortable bladder contractions that you cannot control (spasms) or you leak urine with the spasms.Get help right away if:  You have chills or fever.You have blood in your urine.You have a catheter and:  Your catheter stops draining urine.Your catheter falls out.Summary  Acute urinary retention is a condition in which a person is unable to pass urine. If left untreated, it can result in kidney damage or other serious complications.The cause of this condition may include an enlarged prostate. As men age, their prostate gland may become larger and may start pressing or squeezing on the bladder or the urethra.Treatment for this condition may include medicines and placement of an indwelling urinary catheter.Monitor any changes in your symptoms. Tell your health care provider about any changes.This information is not intended to replace advice given to you by your health care provider. Make sure you discuss any questions you have with your health care provider.    Document Released: 03/26/2002 Document Revised: 11/30/2018 Document Reviewed: 01/19/2018  Elsevier Patient Education © 2020 [employer]

## 2020-08-26 PROCEDURE — 51798 US URINE CAPACITY MEASURE: CPT

## 2020-08-26 PROCEDURE — 99284 EMERGENCY DEPT VISIT MOD MDM: CPT | Mod: 25

## 2020-08-26 PROCEDURE — 81001 URINALYSIS AUTO W/SCOPE: CPT

## 2020-08-26 PROCEDURE — ZZZZZ: CPT

## 2020-08-26 PROCEDURE — 87186 SC STD MICRODIL/AGAR DIL: CPT

## 2020-08-26 PROCEDURE — 51702 INSERT TEMP BLADDER CATH: CPT

## 2020-08-26 PROCEDURE — 87086 URINE CULTURE/COLONY COUNT: CPT

## 2020-08-27 LAB
-  AZTREONAM: SIGNIFICANT CHANGE UP
-  CEFEPIME: SIGNIFICANT CHANGE UP
-  CIPROFLOXACIN: SIGNIFICANT CHANGE UP
-  GENTAMICIN: SIGNIFICANT CHANGE UP
-  PIPERACILLIN/TAZOBACTAM: SIGNIFICANT CHANGE UP
-  TOBRAMYCIN: SIGNIFICANT CHANGE UP
CULTURE RESULTS: SIGNIFICANT CHANGE UP
METHOD TYPE: SIGNIFICANT CHANGE UP
ORGANISM # SPEC MICROSCOPIC CNT: SIGNIFICANT CHANGE UP
ORGANISM # SPEC MICROSCOPIC CNT: SIGNIFICANT CHANGE UP
SPECIMEN SOURCE: SIGNIFICANT CHANGE UP

## 2020-08-28 NOTE — ED POST DISCHARGE NOTE - ADDITIONAL DOCUMENTATION
spoke with pt regarding results. pt was on last dose of cipro day of this visit. no sx's currenlty. f/u with urology for repeat urine testing

## 2020-08-29 DIAGNOSIS — T83.098A OTHER MECHANICAL COMPLICATION OF OTHER URINARY CATHETER, INITIAL ENCOUNTER: ICD-10-CM

## 2020-08-29 DIAGNOSIS — R33.9 RETENTION OF URINE, UNSPECIFIED: ICD-10-CM

## 2020-08-29 DIAGNOSIS — Z79.899 OTHER LONG TERM (CURRENT) DRUG THERAPY: ICD-10-CM

## 2020-08-29 DIAGNOSIS — I10 ESSENTIAL (PRIMARY) HYPERTENSION: ICD-10-CM

## 2020-08-29 DIAGNOSIS — E78.5 HYPERLIPIDEMIA, UNSPECIFIED: ICD-10-CM
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
## 2020-09-04 ENCOUNTER — EMERGENCY (EMERGENCY)
Facility: HOSPITAL | Age: 75
LOS: 1 days | Discharge: HOME | End: 2020-09-04
Attending: EMERGENCY MEDICINE | Admitting: EMERGENCY MEDICINE
Payer: MEDICARE

## 2020-09-04 VITALS
SYSTOLIC BLOOD PRESSURE: 98 MMHG | DIASTOLIC BLOOD PRESSURE: 62 MMHG | RESPIRATION RATE: 18 BRPM | TEMPERATURE: 98 F | HEART RATE: 60 BPM | OXYGEN SATURATION: 96 %

## 2020-09-04 VITALS
WEIGHT: 195.11 LBS | HEIGHT: 72 IN | OXYGEN SATURATION: 97 % | TEMPERATURE: 98 F | DIASTOLIC BLOOD PRESSURE: 60 MMHG | HEART RATE: 71 BPM | RESPIRATION RATE: 18 BRPM | SYSTOLIC BLOOD PRESSURE: 98 MMHG

## 2020-09-04 DIAGNOSIS — Z41.9 ENCOUNTER FOR PROCEDURE FOR PURPOSES OTHER THAN REMEDYING HEALTH STATE, UNSPECIFIED: Chronic | ICD-10-CM

## 2020-09-04 PROCEDURE — 99283 EMERGENCY DEPT VISIT LOW MDM: CPT

## 2020-09-04 PROCEDURE — 51702 INSERT TEMP BLADDER CATH: CPT

## 2020-09-04 PROCEDURE — 99283 EMERGENCY DEPT VISIT LOW MDM: CPT | Mod: 25

## 2020-09-04 NOTE — ED ADULT NURSE REASSESSMENT NOTE - NS ED NURSE REASSESS COMMENT FT1
Francisco catheter inserted using sterile technique, draining by gravity, secured with StatLock. Second RN present to confirm sterility.

## 2020-09-04 NOTE — ED PROVIDER NOTE - PATIENT PORTAL LINK FT
You can access the FollowMyHealth Patient Portal offered by [facility] by registering at the following website: [email or web address]. By joining [facility]’s FollowMyHealth portal, you will also be able to view your health information using other applications (apps) compatible with our system.

## 2020-09-04 NOTE — ED PROVIDER NOTE - CLINICAL SUMMARY MEDICAL DECISION MAKING FREE TEXT BOX
77 y/o M pt presents to ED with catheter problem. Catheter replaced, as incident was only 2hrs prior to arrival and pt had no abdominal pain or fever, feel no need for work up. Pt also currently on abx. Will discharge and advise urology f/u. 77 y/o M pt presents to ED with catheter problem. Catheter replaced, as incident was only 2 hrs prior to arrival and pt had no abdominal pain or fever, feel no need for work up. Pt also currently on abx. Will discharge and advise urology f/u.

## 2020-09-04 NOTE — ED PROVIDER NOTE - OBJECTIVE STATEMENT
75 y/o M pt with PMHx of HLD, HTN, prostate CA, and chronic catheterization due to bladder stone, and PSHx of prostate CA surgery and recent pacemaker procedure presents to ED c/o catheter falling out x 2hr prior to arrival. Pt states he reinserted the catheter however felt he should come in for a new catheter to prevent infection. Pt denies fever, abdominal pain, or any other acute complaints. Pt is currently on Cephalosporin due to his recent procedure. 73 y/o M pt with PM-Hx of HLD, HTN, prostate CA, and chronic catheterization due to bladder stone, and PS-Hx of prostate CA surgery and recent pacemaker procedure presents to ED c/o catheter falling out x 2hr prior to arrival. Pt states he reinserted the catheter however felt he should come in for a new catheter to prevent infection. Pt denies fever, abdominal pain, or any other acute complaints. Pt is currently on Cephalosporin due to his recent procedure.

## 2020-09-04 NOTE — ED ADULT TRIAGE NOTE - CHIEF COMPLAINT QUOTE
Patient states, "my catheter fell out."  Patient denies any CP, SOB, dizziness, N/V, or any other complaints at this time.

## 2020-09-04 NOTE — ED ADULT NURSE NOTE - OBJECTIVE STATEMENT
Pt presents to ED c/o urinary catheter complications. PMHx of bladder stone. Presents w pratt catheter in place. Pt states "my catheter fell out at home, I tried to put it back in but I don't think it is in the right spot". Denies any complaints at this time- no pain, no fevers/chills, n/v/d.

## 2020-09-04 NOTE — ED ADULT NURSE NOTE - NS ED NURSE DISCH DISPOSITION
Left message on VM. Pt had called the office, not able to come to appt. 10-22-18. Unsure if patient was aware that she had some outstanding lab orders she has not had done yet. I let her know this in a message and to call if she has questions.    Discharged

## 2020-09-04 NOTE — ED ADULT NURSE NOTE - NSIMPLEMENTINTERV_GEN_ALL_ED
Implemented All Universal Safety Interventions:  [place] to call system. Call bell, personal items and telephone within reach. Instruct patient to call for assistance. Room bathroom lighting operational. Non-slip footwear when patient is off stretcher. Physically safe environment: no spills, clutter or unnecessary equipment. Stretcher in lowest position, wheels locked, appropriate side rails in place.

## 2020-09-08 DIAGNOSIS — T83.098A OTHER MECHANICAL COMPLICATION OF OTHER URINARY CATHETER, INITIAL ENCOUNTER: ICD-10-CM
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
## 2020-09-30 ENCOUNTER — TRANSCRIPTION ENCOUNTER (OUTPATIENT)
Age: 75
End: 2020-09-30

## 2020-10-01 ENCOUNTER — OUTPATIENT (OUTPATIENT)
Dept: OUTPATIENT SERVICES | Facility: HOSPITAL | Age: 75
LOS: 1 days | Discharge: HOME | End: 2020-10-01
Payer: MEDICARE

## 2020-10-01 ENCOUNTER — RESULT REVIEW (OUTPATIENT)
Age: 75
End: 2020-10-01

## 2020-10-01 DIAGNOSIS — Z41.9 ENCOUNTER FOR PROCEDURE FOR PURPOSES OTHER THAN REMEDYING HEALTH STATE, UNSPECIFIED: Chronic | ICD-10-CM

## 2020-10-01 PROCEDURE — 88300 SURGICAL PATH GROSS: CPT | Mod: 26

## 2020-10-06 LAB — SURGICAL PATHOLOGY STUDY: SIGNIFICANT CHANGE UP

## 2020-10-08 LAB — NIDUS STONE QN: SIGNIFICANT CHANGE UP

## 2020-12-21 NOTE — ED PROVIDER NOTE - NEUROLOGICAL, MLM
Medication:   Requested Prescriptions     Pending Prescriptions Disp Refills    sodium bicarbonate 650 MG tablet [Pharmacy Med Name: SODIUM BICARB 650 MG TABLET] 360 tablet 1     Sig: TAKE 1 TABLET BY MOUTH FOUR TIMES A DAY       Last Filled:  6/24/20 #360, 1 RF    Patient Phone Number: [phone] (home)     Last appt: 10/27/2020  Med check - VV  Next appt: Visit date not found    Lab Results   Component Value Date     08/25/2020    K 4.7 08/25/2020     (H) 08/25/2020    CO2 20 (L) 08/25/2020    BUN 28 (H) 08/25/2020    CREATININE 1.9 (H) 08/25/2020    GLUCOSE 113 (H) 08/25/2020    CALCIUM 9.2 08/25/2020    PROT 7.2 08/25/2020    LABALBU 4.0 08/25/2020    BILITOT <0.2 08/25/2020    ALKPHOS 128 08/25/2020    AST 15 08/25/2020    ALT 13 08/25/2020    LABGLOM 27 (A) 08/25/2020    GFRAA 32 (A) 08/25/2020    AGRATIO 1.3 08/25/2020    GLOB 3.2 08/25/2020
Alert and oriented, no focal deficits, no motor or sensory deficits.

## 2021-10-21 NOTE — ED PROVIDER NOTE - GASTROINTESTINAL, MLM
DOD Dr. Layne refilled for 30 day supply  Message sent to patient.    Abdomen soft, non-tender, no guarding.

## 2022-01-04 ENCOUNTER — APPOINTMENT (OUTPATIENT)
Age: 77
End: 2022-01-04

## 2022-03-23 DIAGNOSIS — Z12.11 ENCOUNTER FOR SCREENING FOR MALIGNANT NEOPLASM OF COLON: ICD-10-CM

## 2022-04-04 ENCOUNTER — NON-APPOINTMENT (OUTPATIENT)
Age: 77
End: 2022-04-04

## 2022-04-05 LAB — SARS-COV-2 N GENE NPH QL NAA+PROBE: NOT DETECTED

## 2022-04-06 ENCOUNTER — OUTPATIENT (OUTPATIENT)
Dept: OUTPATIENT SERVICES | Facility: HOSPITAL | Age: 77
LOS: 1 days | Discharge: HOME | End: 2022-04-06
Payer: MEDICARE

## 2022-04-06 ENCOUNTER — RESULT REVIEW (OUTPATIENT)
Age: 77
End: 2022-04-06

## 2022-04-06 ENCOUNTER — APPOINTMENT (OUTPATIENT)
Age: 77
End: 2022-04-06

## 2022-04-06 DIAGNOSIS — Z41.9 ENCOUNTER FOR PROCEDURE FOR PURPOSES OTHER THAN REMEDYING HEALTH STATE, UNSPECIFIED: Chronic | ICD-10-CM

## 2022-04-06 PROCEDURE — 45380 COLONOSCOPY AND BIOPSY: CPT

## 2022-04-06 PROCEDURE — 88305 TISSUE EXAM BY PATHOLOGIST: CPT | Mod: 26

## 2022-04-06 PROCEDURE — 88305 TISSUE EXAM BY PATHOLOGIST: CPT

## 2022-04-06 PROCEDURE — 45385 COLONOSCOPY W/LESION REMOVAL: CPT

## 2022-04-07 LAB — SURGICAL PATHOLOGY STUDY: SIGNIFICANT CHANGE UP

## 2025-05-06 NOTE — ED ADULT TRIAGE NOTE - WEIGHT IN KG
WOUND CARE CENTER DISCHARGE INSTRUCTIONS:    NO OPEN WOUNDS  We have created a list of reminders to assist you in preventing future wounds:     1.  Check your skin daily for reddened areas, abrasions, or sores.   If a new wound is noted call [phone] for an appointment at the   Wound Care Center.   2.  Clean and dry your skin, using mild soap and warm water (not hot) daily.   3.  If your skin appears dry, apply lotion daily.   4.  Avoid pressure to recently healed wounds.  The skin is  and will                         breakdown easily.        5.  Wear well fitting shoes and socks.   6.  Resume your ordinary activities as tolerated.   7.  Maintain a nutritious diet avoiding alcohol, caffeine, and smoking.-INCREASE PROTEIN INTAKE   8.  Return to your primary care physician for all of your health issues and medications.   9.  For leg wounds, continue to use compression stockings and keep your legs elevated       above the level of the heart as directed in the Wound Care Center.  10.  ALOE AND AQUAPHOR AS DIRECTED DAILY      CONTACT THE WOUND CARE CENTER -[phone] IF YOU DEVELOP ANOTHER WOUND.    We strive for improvement. A survey will arrive in the mail from \"[facility]\". We appreciate your comments.    Physician's Signature_________________________________  
88.5

## 2025-07-29 ENCOUNTER — APPOINTMENT (OUTPATIENT)
Dept: NEPHROLOGY | Facility: CLINIC | Age: 80
End: 2025-07-29

## (undated) DEVICE — SNARE CAPTIVATOR RND COLD STIFF 10X2.8MM